# Patient Record
Sex: MALE | Race: WHITE | NOT HISPANIC OR LATINO | Employment: FULL TIME | URBAN - NONMETROPOLITAN AREA
[De-identification: names, ages, dates, MRNs, and addresses within clinical notes are randomized per-mention and may not be internally consistent; named-entity substitution may affect disease eponyms.]

---

## 2018-04-19 ENCOUNTER — OFFICE VISIT (OUTPATIENT)
Dept: CARDIOLOGY | Facility: CLINIC | Age: 53
End: 2018-04-19

## 2018-04-19 VITALS
DIASTOLIC BLOOD PRESSURE: 86 MMHG | WEIGHT: 255.2 LBS | SYSTOLIC BLOOD PRESSURE: 161 MMHG | OXYGEN SATURATION: 97 % | HEIGHT: 71 IN | BODY MASS INDEX: 35.73 KG/M2 | HEART RATE: 69 BPM

## 2018-04-19 DIAGNOSIS — R07.2 PRECORDIAL PAIN: Primary | ICD-10-CM

## 2018-04-19 DIAGNOSIS — I10 ESSENTIAL HYPERTENSION: ICD-10-CM

## 2018-04-19 DIAGNOSIS — R06.02 SHORTNESS OF BREATH: ICD-10-CM

## 2018-04-19 PROCEDURE — 99204 OFFICE O/P NEW MOD 45 MIN: CPT | Performed by: PHYSICIAN ASSISTANT

## 2018-04-19 RX ORDER — ASPIRIN 81 MG/1
81 TABLET ORAL DAILY
COMMUNITY

## 2018-04-19 RX ORDER — SIMVASTATIN 40 MG
40 TABLET ORAL DAILY
Refills: 3 | COMMUNITY
Start: 2018-03-29

## 2018-04-19 RX ORDER — OMEPRAZOLE 20 MG/1
20 CAPSULE, DELAYED RELEASE ORAL DAILY
Refills: 0 | COMMUNITY
Start: 2018-04-13

## 2018-04-19 NOTE — PROGRESS NOTES
Subjective   Lefty Delarosa is a 52 y.o. male     Chief Complaint   Patient presents with   • Establish Care   • Chest Pain     Here for eval. onset 1 week ago, pain does not radiate, no shortness of breath, has been started on prilosec and pain has eased off        HPI  The patient presents in today for initial evaluation.  He presents because of chest discomfort.  The patient denies any cardiovascular history.  He did have evaluation a proximally 3-4 years ago with Dr. MCCORMACK.  Stress test at that time suggested inferior ischemia.  No high-risk markers were noted and medical management was recommended.  He has done well up until approximately one week ago.  At that time, he started noticing midepigastric and lower chest discomfort.  He feels that this is likely reflux.  This tends to occur when stressed or anxious.  He reports that he has a sensation of fullness or heaviness.  Once belching, symptoms tend to improve.  He has associated shortness of air but no diaphoresis or nausea at that time.  The patient denies dysrhythmic symptoms, noting no palpitations, dizziness, or syncope.  The patient denies PND orthopnea.  Exercise capacity is adequate.  He has no reproduction of chest pain during exercise.  As a matter fact, he reports that he feels much improved once exercising.  He has no further complaints otherwise.      Current Outpatient Prescriptions   Medication Sig Dispense Refill   • aspirin 81 MG EC tablet Take 81 mg by mouth Daily.     • metoprolol tartrate (LOPRESSOR) 25 MG tablet 12.5 mg Daily.  1   • omeprazole (priLOSEC) 20 MG capsule Daily.  0   • simvastatin (ZOCOR) 40 MG tablet Daily.  3     No current facility-administered medications for this visit.        Review of patient's allergies indicates no known allergies.    History reviewed. No pertinent past medical history.    Social History     Social History   • Marital status:      Spouse name: N/A   • Number of children: N/A   • Years of education:  N/A     Occupational History   • Not on file.     Social History Main Topics   • Smoking status: Former Smoker     Types: Cigarettes   • Smokeless tobacco: Never Used      Comment: used to smoke approx. 1 PPD for approx. 35 years   • Alcohol use No      Comment: in the past   • Drug use:      Types: Cocaine, Marijuana      Comment: in the past   • Sexual activity: Defer     Other Topics Concern   • Not on file     Social History Narrative   • No narrative on file       Family History   Problem Relation Age of Onset   • Diabetes Mother    • Hypertension Father    • Heart disease Father    • Diabetes Father        Review of Systems   Constitutional: Positive for fatigue. Negative for chills, diaphoresis and fever.   HENT: Positive for congestion. Negative for dental problem, drooling, ear discharge, ear pain, facial swelling, hearing loss, mouth sores, nosebleeds, postnasal drip, rhinorrhea, sinus pain, sinus pressure, sneezing, sore throat, tinnitus, trouble swallowing and voice change.    Respiratory: Positive for chest tightness. Negative for apnea, cough, choking, shortness of breath (no orthopnea or PND), wheezing and stridor.    Cardiovascular: Positive for chest pain (Here for eval. onset 1 week ago, pain does not radiate, no shortness of breath, has been started on prilosec and pain has eased off). Negative for palpitations and leg swelling.   Gastrointestinal: Negative for abdominal pain, blood in stool (no melena, no hematuria ,no hematemesis, no hematochezia ), constipation, diarrhea, nausea and vomiting.   Endocrine: Negative for cold intolerance and heat intolerance.   Genitourinary: Negative for difficulty urinating, frequency, hematuria and urgency.   Musculoskeletal: Negative for arthralgias, back pain, gait problem, joint swelling, myalgias, neck pain and neck stiffness.   Skin: Negative for color change, pallor, rash and wound.   Allergic/Immunologic: Negative for environmental allergies, food  "allergies and immunocompromised state.   Neurological: Positive for light-headedness (imbalance ). Negative for dizziness, tremors, seizures, syncope, facial asymmetry (no stroke like symptoms), speech difficulty, weakness, numbness and headaches.   Hematological: Negative for adenopathy. Does not bruise/bleed easily.   Psychiatric/Behavioral: Negative.        Objective     Vitals:    04/19/18 1336   BP: 161/86   BP Location: Left arm   Patient Position: Sitting   Pulse: 69   SpO2: 97%   Weight: 116 kg (255 lb 3.2 oz)   Height: 180.3 cm (71\")        /86 (BP Location: Left arm, Patient Position: Sitting)   Pulse 69   Ht 180.3 cm (71\")   Wt 116 kg (255 lb 3.2 oz)   SpO2 97%   BMI 35.59 kg/m²      Lab Results (most recent)     None          Physical Exam   Constitutional: He is oriented to person, place, and time. He appears well-developed and well-nourished. No distress.   HENT:   Head: Normocephalic and atraumatic.   Eyes: Conjunctivae and EOM are normal. Pupils are equal, round, and reactive to light.   Neck: Normal range of motion. Neck supple. No JVD present. No tracheal deviation present.   Cardiovascular: Normal rate, regular rhythm, normal heart sounds and intact distal pulses.    Pulmonary/Chest: Effort normal and breath sounds normal.   Abdominal: Soft. Bowel sounds are normal. He exhibits no distension and no mass. There is no tenderness. There is no rebound and no guarding.   Musculoskeletal: Normal range of motion. He exhibits no edema, tenderness or deformity.   Neurological: He is alert and oriented to person, place, and time.   Skin: Skin is warm and dry. No rash noted. No erythema. No pallor.   Psychiatric: He has a normal mood and affect. His behavior is normal. Judgment and thought content normal.   Nursing note and vitals reviewed.      Procedure   Procedures         Assessment/Plan      Diagnosis Plan   1. Precordial pain  Stress Test With Myocardial Perfusion One Day    Adult " Transthoracic Echo Complete W/ Cont if Necessary Per Protocol    Stress Test With Myocardial Perfusion One Day    Adult Transthoracic Echo Complete W/ Cont if Necessary Per Protocol   2. Shortness of breath  Stress Test With Myocardial Perfusion One Day    Adult Transthoracic Echo Complete W/ Cont if Necessary Per Protocol    Stress Test With Myocardial Perfusion One Day    Adult Transthoracic Echo Complete W/ Cont if Necessary Per Protocol   3. Essential hypertension  Stress Test With Myocardial Perfusion One Day    Adult Transthoracic Echo Complete W/ Cont if Necessary Per Protocol    Stress Test With Myocardial Perfusion One Day    Adult Transthoracic Echo Complete W/ Cont if Necessary Per Protocol       I will schedule the patient for stress test and an echo.  Previous stress test suggested inferior ischemia.  As there were no high-risk markers, the patient was managed medically at that time.  We will repeat that to ensure no increasing risk or ischemic burden.  I will continue medications with no changes.  We will see the patient back to review studies, sooner if indicated by clinical course or symptoms.         Patient's Body mass index is 35.59 kg/m². BMI is above normal parameters. Follow-up plan includes:  referral to primary care.           Electronically signed by:

## 2018-05-29 ENCOUNTER — HOSPITAL ENCOUNTER (OUTPATIENT)
Dept: CARDIOLOGY | Facility: HOSPITAL | Age: 53
Discharge: HOME | End: 2018-05-29

## 2018-05-29 ENCOUNTER — OUTSIDE FACILITY SERVICE (OUTPATIENT)
Dept: CARDIOLOGY | Facility: CLINIC | Age: 53
End: 2018-05-29

## 2018-05-29 LAB
MAXIMAL PREDICTED HEART RATE: 166 BPM
MAXIMAL PREDICTED HEART RATE: 166 BPM
STRESS TARGET HR: 141 BPM
STRESS TARGET HR: 141 BPM

## 2018-05-29 PROCEDURE — 0 TECHNETIUM SESTAMIBI: Performed by: INTERNAL MEDICINE

## 2018-05-29 PROCEDURE — 78452 HT MUSCLE IMAGE SPECT MULT: CPT | Performed by: INTERNAL MEDICINE

## 2018-05-29 PROCEDURE — 78452 HT MUSCLE IMAGE SPECT MULT: CPT

## 2018-05-29 PROCEDURE — 93017 CV STRESS TEST TRACING ONLY: CPT

## 2018-05-29 PROCEDURE — 93018 CV STRESS TEST I&R ONLY: CPT | Performed by: INTERNAL MEDICINE

## 2018-05-29 PROCEDURE — 93306 TTE W/DOPPLER COMPLETE: CPT

## 2018-05-29 PROCEDURE — A9500 TC99M SESTAMIBI: HCPCS | Performed by: INTERNAL MEDICINE

## 2018-05-29 PROCEDURE — 93306 TTE W/DOPPLER COMPLETE: CPT | Performed by: INTERNAL MEDICINE

## 2018-05-29 RX ADMIN — TECHNETIUM TC 99M SESTAMIBI 1 DOSE: 1 INJECTION INTRAVENOUS at 11:15

## 2018-05-31 ENCOUNTER — DOCUMENTATION (OUTPATIENT)
Dept: CARDIOLOGY | Facility: CLINIC | Age: 53
End: 2018-05-31

## 2018-07-12 ENCOUNTER — OFFICE VISIT (OUTPATIENT)
Dept: CARDIOLOGY | Facility: CLINIC | Age: 53
End: 2018-07-12

## 2018-07-12 VITALS
DIASTOLIC BLOOD PRESSURE: 91 MMHG | SYSTOLIC BLOOD PRESSURE: 139 MMHG | WEIGHT: 261 LBS | HEIGHT: 71 IN | BODY MASS INDEX: 36.54 KG/M2 | OXYGEN SATURATION: 96 % | HEART RATE: 61 BPM

## 2018-07-12 DIAGNOSIS — R07.2 PRECORDIAL PAIN: Primary | ICD-10-CM

## 2018-07-12 DIAGNOSIS — I10 ESSENTIAL HYPERTENSION: ICD-10-CM

## 2018-07-12 DIAGNOSIS — R06.02 SHORTNESS OF BREATH: ICD-10-CM

## 2018-07-12 PROCEDURE — 99213 OFFICE O/P EST LOW 20 MIN: CPT | Performed by: PHYSICIAN ASSISTANT

## 2018-07-12 NOTE — PROGRESS NOTES
Problem list     Subjective   Lefty Delarosa is a 53 y.o. male     Chief Complaint   Patient presents with   • Follow-up     patient appears in office today for follow up test results    • Chest Pain       HPI  The patient present in today for follow-up a stress and echo findings.  He was scheduled for that for risk stratification and because of chest and jaw discomfort.  He did have evaluation with Dr. MCCORMACK a number of years ago.  He had inferior ischemia by stress test at that time.  As there were no high-risk markers, catheterization was not pursued.  We wanted to ensure no increasing ischemic burden or risk otherwise.  His stress test in April suggest no evidence of ischemia.  Echo suggested preserved systolic function and no significant valve lesions.  He started taking Prilosec and started exercising.  His chest pain has now resolved.  His dyspnea is improving with exercise.  Blood pressures of been very well-controlled.  The patient has no further complaints otherwise and feels that he is doing well.    Current Outpatient Prescriptions   Medication Sig Dispense Refill   • aspirin 81 MG EC tablet Take 81 mg by mouth Daily.     • metoprolol tartrate (LOPRESSOR) 25 MG tablet Take 12.5 mg by mouth Daily.  1   • omeprazole (priLOSEC) 20 MG capsule Take 20 mg by mouth Daily.  0   • simvastatin (ZOCOR) 40 MG tablet Take 40 mg by mouth Daily.  3     No current facility-administered medications for this visit.        Patient has no known allergies.    History reviewed. No pertinent past medical history.    Social History     Social History   • Marital status:      Spouse name: N/A   • Number of children: N/A   • Years of education: N/A     Occupational History   • Not on file.     Social History Main Topics   • Smoking status: Former Smoker     Types: Cigarettes   • Smokeless tobacco: Never Used      Comment: used to smoke approx. 1 PPD for approx. 35 years   • Alcohol use No      Comment: in the past   • Drug use:  "Yes     Types: Cocaine, Marijuana      Comment: in the past   • Sexual activity: Defer     Other Topics Concern   • Not on file     Social History Narrative   • No narrative on file       Family History   Problem Relation Age of Onset   • Diabetes Mother    • Hypertension Father    • Heart disease Father    • Diabetes Father        Review of Systems   Constitutional: Negative.  Negative for fatigue.   HENT: Negative.  Negative for congestion, rhinorrhea, sneezing and sore throat.    Eyes: Positive for visual disturbance (wears reading glasses).   Respiratory: Positive for shortness of breath (easily SOA with exertion ). Negative for apnea, cough, chest tightness and wheezing.    Cardiovascular: Negative.  Negative for chest pain (denies CP), palpitations (denies palpitaitons) and leg swelling.   Gastrointestinal: Negative.  Negative for abdominal distention, abdominal pain, nausea and vomiting.   Endocrine: Negative.  Negative for cold intolerance, heat intolerance, polyphagia and polyuria.   Genitourinary: Negative.  Negative for difficulty urinating, frequency and urgency.   Musculoskeletal: Negative.  Negative for arthralgias, back pain, myalgias, neck pain and neck stiffness.   Skin: Negative.  Negative for rash and wound.   Allergic/Immunologic: Positive for environmental allergies (seasonal allergies). Negative for food allergies.   Neurological: Negative.  Negative for dizziness, syncope, weakness, light-headedness and headaches.   Hematological: Negative.  Does not bruise/bleed easily.   Psychiatric/Behavioral: Negative for agitation, confusion and sleep disturbance (denies waking up smothering/SOA). The patient is nervous/anxious (easily nervous/anxious).        Objective   Vitals:    07/12/18 1018   BP: 139/91   BP Location: Left arm   Patient Position: Sitting   Pulse: 61   SpO2: 96%   Weight: 118 kg (261 lb)   Height: 180.3 cm (71\")      /91 (BP Location: Left arm, Patient Position: Sitting)   " "Pulse 61   Ht 180.3 cm (71\")   Wt 118 kg (261 lb)   SpO2 96%   BMI 36.40 kg/m²    Lab Results (most recent)     None        Physical Exam   Constitutional: He is oriented to person, place, and time. He appears well-developed and well-nourished. No distress.   HENT:   Head: Normocephalic and atraumatic.   Eyes: Conjunctivae and EOM are normal. Pupils are equal, round, and reactive to light.   Neck: Normal range of motion. Neck supple. No JVD present. No tracheal deviation present.   Cardiovascular: Normal rate, regular rhythm, normal heart sounds and intact distal pulses.    Pulmonary/Chest: Effort normal and breath sounds normal.   Abdominal: Soft. Bowel sounds are normal. He exhibits no distension and no mass. There is no tenderness. There is no rebound and no guarding.   Musculoskeletal: Normal range of motion. He exhibits no edema, tenderness or deformity.   Neurological: He is alert and oriented to person, place, and time.   Skin: Skin is warm and dry. No rash noted. No erythema. No pallor.   Psychiatric: He has a normal mood and affect. His behavior is normal. Judgment and thought content normal.   Nursing note and vitals reviewed.        Procedure   Procedures       Assessment/Plan      Diagnosis Plan   1. Precordial pain     2. Shortness of breath     3. Essential hypertension         The patient's stress test and echo studies were unremarkable.  His symptoms have resolved once taking PPI therapy.  He has now started exercising again, exerting at increased levels without any limitation from cardiovascular standpoint.  He feels better than he has in some time.  I feel nothing further is indicated.  We can see him back in one year, sooner if indicated by recurrent symptoms or issues otherwise.            Patient's Body mass index is 36.4 kg/m². BMI is above normal parameters. Recommendations include: educational material and referral to primary care.             Electronically signed by:      "

## 2018-07-12 NOTE — PATIENT INSTRUCTIONS
